# Patient Record
(demographics unavailable — no encounter records)

---

## 2025-02-27 NOTE — HISTORY OF PRESENT ILLNESS
[Last Pap Date: ___] : Last Pap Date: [unfilled] [Delivery Date: ___] : on [unfilled] [] : delivered by vaginal delivery [Male] : Delivery History: baby boy [Back to Normal] : is back to normal in size [Normal] : the vagina was normal [Healing Well] : is healing well [Cervix Sample Taken] : cervical sample taken for a Pap smear [Examination Of The Breasts] : breasts are normal [Doing Well] : is doing well [No Sign of Infection] : is showing no signs of infection [Excellent Pain Control] : has excellent pain control [None] : None [de-identified] : induction FGR 40w3d [de-identified] : stopped bleeding a few weeks ago, ambulating, no pain, +BM [de-identified] : Having stress/overflow incontinence which is improving. Also having difficulty with nursing, son will not latch. Has been pumping [de-identified] : 24 yo P1, s/p , doing well [de-identified] : f/up pap, vaginitis. Referral to lactation consultant. Consider pelvic floor PT referral if not improving. Condoms for contraception. Return to office 1yr annual exam or PRN

## 2025-04-10 NOTE — DISCUSSION/SUMMARY
[FreeTextEntry1] : 24 yo P1 with mastitis  - PO dicloxacillin 500mg QID x10 days to pharmacy - use lanolin cream for nipple irritation - encourage warm compresses, motrin/tylenol, frequent pumping/nursing until breasts empty - return for annual or PRN

## 2025-04-10 NOTE — HISTORY OF PRESENT ILLNESS
[FreeTextEntry1] : 26 yo P1 presents with breast issue. She is nursing/pumping for infant. Reports experiencing right breast/nipple pain along with fever/chills since Tuesday night. Symptoms not improving despite regular nursing sessions.